# Patient Record
Sex: MALE | Race: WHITE | NOT HISPANIC OR LATINO | URBAN - METROPOLITAN AREA
[De-identification: names, ages, dates, MRNs, and addresses within clinical notes are randomized per-mention and may not be internally consistent; named-entity substitution may affect disease eponyms.]

---

## 2017-01-10 ENCOUNTER — APPOINTMENT (RX ONLY)
Dept: URBAN - METROPOLITAN AREA CLINIC 125 | Facility: CLINIC | Age: 82
Setting detail: DERMATOLOGY
End: 2017-01-10

## 2017-01-10 DIAGNOSIS — L30.9 DERMATITIS, UNSPECIFIED: ICD-10-CM

## 2017-01-10 DIAGNOSIS — Z85.828 PERSONAL HISTORY OF OTHER MALIGNANT NEOPLASM OF SKIN: ICD-10-CM

## 2017-01-10 DIAGNOSIS — D22 MELANOCYTIC NEVI: ICD-10-CM

## 2017-01-10 DIAGNOSIS — L81.4 OTHER MELANIN HYPERPIGMENTATION: ICD-10-CM

## 2017-01-10 DIAGNOSIS — L82.1 OTHER SEBORRHEIC KERATOSIS: ICD-10-CM

## 2017-01-10 PROBLEM — D22.5 MELANOCYTIC NEVI OF TRUNK: Status: ACTIVE | Noted: 2017-01-10

## 2017-01-10 PROCEDURE — 11101: CPT

## 2017-01-10 PROCEDURE — ? BIOPSY BY PUNCH METHOD

## 2017-01-10 PROCEDURE — 99214 OFFICE O/P EST MOD 30 MIN: CPT | Mod: 25

## 2017-01-10 PROCEDURE — ? TREATMENT REGIMEN

## 2017-01-10 PROCEDURE — ? PATIENT SPECIFIC COUNSELING

## 2017-01-10 PROCEDURE — 11100: CPT

## 2017-01-10 PROCEDURE — ? COUNSELING

## 2017-01-10 ASSESSMENT — LOCATION SIMPLE DESCRIPTION DERM
LOCATION SIMPLE: RIGHT PRETIBIAL REGION
LOCATION SIMPLE: RIGHT THIGH
LOCATION SIMPLE: LEFT PRETIBIAL REGION
LOCATION SIMPLE: LEFT FOREARM
LOCATION SIMPLE: RIGHT FOREARM
LOCATION SIMPLE: ABDOMEN
LOCATION SIMPLE: RIGHT UPPER BACK
LOCATION SIMPLE: RIGHT UPPER ARM

## 2017-01-10 ASSESSMENT — LOCATION DETAILED DESCRIPTION DERM
LOCATION DETAILED: LEFT DISTAL PRETIBIAL REGION
LOCATION DETAILED: RIGHT ANTERIOR DISTAL UPPER ARM
LOCATION DETAILED: RIGHT LATERAL DISTAL PRETIBIAL REGION
LOCATION DETAILED: RIGHT DISTAL DORSAL FOREARM
LOCATION DETAILED: RIGHT SUPERIOR UPPER BACK
LOCATION DETAILED: RIGHT ANTERIOR PROXIMAL THIGH
LOCATION DETAILED: RIGHT MEDIAL UPPER BACK
LOCATION DETAILED: EPIGASTRIC SKIN
LOCATION DETAILED: LEFT PROXIMAL DORSAL FOREARM

## 2017-01-10 ASSESSMENT — LOCATION ZONE DERM
LOCATION ZONE: TRUNK
LOCATION ZONE: ARM
LOCATION ZONE: LEG

## 2017-01-10 NOTE — PROCEDURE: BIOPSY BY PUNCH METHOD
Notification Instructions: Patient will be notified of biopsy results. However, patient instructed to call the office if not contacted within 2 weeks.
Size Of Lesion In Cm (Optional): 0
Punch Size In Mm: 3
Bill For Surgical Tray: no
Epidermal Sutures: 5-0 Ethilon
Anesthesia Type: 1% lidocaine without epinephrine
Lab Facility: [facility]
Biopsy Type: H and E
Dressing: pressure dressing
Detail Level: Simple
Wound Care: Bacitracin
Post-Care Instructions: I reviewed with the patient in detail post-care instructions. Patient is to keep the biopsy site dry overnight, and then apply bacitracin twice daily until healed. Patient may apply hydrogen peroxide soaks to remove any crusting.
Suture Removal: 7 days
Body Location Override (Optional - Billing Will Still Be Based On Selected Body Map Location If Applicable): R thigh
Anesthesia Volume In Cc (Will Not Render If 0): 1.5
Consent: Written consent was obtained and risks were reviewed including but not limited to scarring, infection, bleeding, scabbing, incomplete removal, nerve damage and allergy to anesthesia.
Lab: [facility] [place]
Hemostasis: None
Billing Type: [employer]
Home Suture Removal Text: Patient was provided a home suture removal kit and will remove their sutures at home. If they have any questions or difficulties they will call the office.
Lab: [id number]
Billing Type: Third-Party Bill
Home Suture Removal Text: Patient was provided a home suture removal kit and will remove their sutures at home.  If they have any questions or difficulties they will call the office.
Lab Facility: [id number]
Body Location Override (Optional - Billing Will Still Be Based On Selected Body Map Location If Applicable): R upper arm

## 2017-01-10 NOTE — PROCEDURE: PATIENT SPECIFIC COUNSELING
Detail Level: Zone
ERUPTION ON THIGHS MORE THAN ANTERIOR LEGS AND ARMS, AND PREVIOUSLY ON FACE, WITH CHARACTERISTICS OF PSORIASIFORM VS. LICHENOID VS. ECZEMATOUS REACTION. NO CLEAR RELATION TO ACTIVITIES, TOPICAL OR EXPOSURES. REVIEWED MEDS. CONSIDER RXN TO LISINOPRIL VS. ATORVASTATIN.   PBX X 2.

## 2017-01-10 NOTE — PROCEDURE: TREATMENT REGIMEN
Detail Level: Zone
Initiate Treatment: Sun protection light clothing and SPF 30 or higher fluocinonide cream twice daily to rash

## 2017-01-10 NOTE — HPI: FULL BODY SKIN EXAMINATION
What Is The Reason For Today's Visit?: Full Body Skin Examination
What Is The Reason For Today's Visit? (Being Monitored For X): concerning skin lesions on an annual basis
Additional History: Bcc nose many years ago

## 2017-04-19 ENCOUNTER — APPOINTMENT (RX ONLY)
Dept: URBAN - METROPOLITAN AREA CLINIC 125 | Facility: CLINIC | Age: 82
Setting detail: DERMATOLOGY
End: 2017-04-19

## 2017-04-19 DIAGNOSIS — Z85.828 PERSONAL HISTORY OF OTHER MALIGNANT NEOPLASM OF SKIN: ICD-10-CM

## 2017-04-19 DIAGNOSIS — L57.0 ACTINIC KERATOSIS: ICD-10-CM

## 2017-04-19 DIAGNOSIS — L71.8 OTHER ROSACEA: ICD-10-CM

## 2017-04-19 DIAGNOSIS — L40.0 PSORIASIS VULGARIS: ICD-10-CM | Status: IMPROVED

## 2017-04-19 PROCEDURE — ? TREATMENT REGIMEN

## 2017-04-19 PROCEDURE — 17000 DESTRUCT PREMALG LESION: CPT

## 2017-04-19 PROCEDURE — 99213 OFFICE O/P EST LOW 20 MIN: CPT | Mod: 25

## 2017-04-19 PROCEDURE — ? LIQUID NITROGEN

## 2017-04-19 PROCEDURE — ? COUNSELING

## 2017-04-19 ASSESSMENT — LOCATION ZONE DERM
LOCATION ZONE: LEG
LOCATION ZONE: LIP
LOCATION ZONE: FACE

## 2017-04-19 ASSESSMENT — LOCATION DETAILED DESCRIPTION DERM
LOCATION DETAILED: RIGHT INFERIOR VERMILION LIP
LOCATION DETAILED: LEFT CENTRAL MALAR CHEEK
LOCATION DETAILED: RIGHT KNEE
LOCATION DETAILED: LEFT KNEE
LOCATION DETAILED: RIGHT LATERAL MALAR CHEEK

## 2017-04-19 ASSESSMENT — LOCATION SIMPLE DESCRIPTION DERM
LOCATION SIMPLE: LEFT KNEE
LOCATION SIMPLE: RIGHT CHEEK
LOCATION SIMPLE: LEFT CHEEK
LOCATION SIMPLE: RIGHT KNEE
LOCATION SIMPLE: RIGHT LIP

## 2017-04-19 NOTE — PROCEDURE: LIQUID NITROGEN
Duration Of Freeze Thaw-Cycle (Seconds): 1
Render Post-Care Instructions In Note?: no
Number Of Freeze-Thaw Cycles: 2 freeze-thaw cycles
Consent: The patient's consent was obtained including but not limited to risks of crusting, scabbing, blistering, scarring, darker or lighter pigmentary change, recurrence, incomplete removal and infection.
Post-Care Instructions: I reviewed with the patient in detail post-care instructions. Patient is to wear sunprotection, and avoid picking at any of the treated lesions. Pt may apply Vaseline to crusted or scabbing areas.
Detail Level: Simple

## 2019-09-30 ENCOUNTER — PREPPED CHART (OUTPATIENT)
Dept: URBAN - METROPOLITAN AREA CLINIC 32 | Facility: CLINIC | Age: 84
End: 2019-09-30

## 2019-10-28 ASSESSMENT — KERATOMETRY
OD_AXISANGLE2_DEGREES: 115
OD_K2POWER_DIOPTERS: 43.5
OS_AXISANGLE_DEGREES: 155
OS_K2POWER_DIOPTERS: 43.75
OS_K1POWER_DIOPTERS: 45.25
OS_AXISANGLE2_DEGREES: 65
OD_K1POWER_DIOPTERS: 45.5
OD_AXISANGLE_DEGREES: 25

## 2019-10-28 ASSESSMENT — VISUAL ACUITY
OD_PH: 20/50+1
OD_CC: 20/60+1
OS_CC: 20/40+2

## 2019-10-28 ASSESSMENT — TONOMETRY
OD_IOP_MMHG: 16
OS_IOP_MMHG: 17

## 2019-11-07 ENCOUNTER — IOP CHECK (OUTPATIENT)
Dept: URBAN - METROPOLITAN AREA CLINIC 32 | Facility: CLINIC | Age: 84
End: 2019-11-07

## 2019-11-07 DIAGNOSIS — H40.1132: ICD-10-CM

## 2019-11-07 PROCEDURE — 3284F IOP RED >=15% PRE-NTRV LVL: CPT

## 2019-11-07 PROCEDURE — 1036F TOBACCO NON-USER: CPT

## 2019-11-07 PROCEDURE — 99212 OFFICE O/P EST SF 10 MIN: CPT

## 2019-11-07 PROCEDURE — 3285F IOP DOWN <15% OF PRE-SVC LVL: CPT

## 2019-11-07 PROCEDURE — 0517F GLAUCOMA PLAN OF CARE DOCD: CPT

## 2019-11-07 PROCEDURE — G8428 CUR MEDS NOT DOCUMENT: HCPCS

## 2019-11-07 PROCEDURE — G9744 PT NOT ELI D/T ACT DIG HTN: HCPCS

## 2019-11-07 PROCEDURE — 2027F OPTIC NERVE HEAD EVAL DONE: CPT

## 2019-11-07 PROCEDURE — G8756 NO BP MEASURE DOC: HCPCS

## 2019-11-07 PROCEDURE — G9903 PT SCRN TBCO ID AS NON USER: HCPCS

## 2019-11-07 ASSESSMENT — KERATOMETRY
OD_AXISANGLE2_DEGREES: 115
OS_K2POWER_DIOPTERS: 43.75
OD_K2POWER_DIOPTERS: 43.5
OS_AXISANGLE2_DEGREES: 65
OS_AXISANGLE_DEGREES: 155
OS_K1POWER_DIOPTERS: 45.25
OD_AXISANGLE_DEGREES: 25
OD_K1POWER_DIOPTERS: 45.5

## 2019-11-07 ASSESSMENT — VISUAL ACUITY
OD_CC: 20/25
OS_CC: 20/25

## 2019-11-07 ASSESSMENT — TONOMETRY
OS_IOP_MMHG: 18
OD_IOP_MMHG: 17

## 2020-01-06 ENCOUNTER — EST. PATIENT EMERGENCY (OUTPATIENT)
Dept: URBAN - METROPOLITAN AREA CLINIC 32 | Facility: CLINIC | Age: 85
End: 2020-01-06

## 2020-01-06 DIAGNOSIS — H04.123: ICD-10-CM

## 2020-01-06 DIAGNOSIS — Z96.1: ICD-10-CM

## 2020-01-06 DIAGNOSIS — Z98.890: ICD-10-CM

## 2020-01-06 PROCEDURE — 99212 OFFICE O/P EST SF 10 MIN: CPT

## 2020-01-06 PROCEDURE — 92015 DETERMINE REFRACTIVE STATE: CPT

## 2020-01-06 ASSESSMENT — VISUAL ACUITY
OS_CC: 20/40-2
OD_CC: J3
OD_CC: 20/60-2
OS_CC: J2
OD_PH: 20/50-2

## 2020-01-06 ASSESSMENT — KERATOMETRY
OD_AXISANGLE2_DEGREES: 107
OS_AXISANGLE2_DEGREES: 68
OS_K1POWER_DIOPTERS: 45.25
OS_AXISANGLE_DEGREES: 158
OD_K1POWER_DIOPTERS: 45.5
OD_AXISANGLE_DEGREES: 25
OS_K2POWER_DIOPTERS: 43.25
OD_AXISANGLE_DEGREES: 17
OD_AXISANGLE2_DEGREES: 115
OD_K2POWER_DIOPTERS: 42.5

## 2020-01-06 ASSESSMENT — TONOMETRY
OS_IOP_MMHG: 18
OD_IOP_MMHG: 17

## 2020-06-01 ENCOUNTER — VISUAL FIELD FOLLOW-UP (OUTPATIENT)
Dept: URBAN - METROPOLITAN AREA CLINIC 32 | Facility: CLINIC | Age: 85
End: 2020-06-01

## 2020-06-01 DIAGNOSIS — H40.1132: ICD-10-CM

## 2020-06-01 PROCEDURE — 92083 EXTENDED VISUAL FIELD XM: CPT

## 2020-06-01 PROCEDURE — 99213 OFFICE O/P EST LOW 20 MIN: CPT

## 2020-06-01 ASSESSMENT — KERATOMETRY
OS_K2POWER_DIOPTERS: 43.25
OD_K2POWER_DIOPTERS: 42.5
OD_AXISANGLE_DEGREES: 17
OD_AXISANGLE_DEGREES: 25
OD_AXISANGLE2_DEGREES: 115
OD_K1POWER_DIOPTERS: 45.5
OS_AXISANGLE_DEGREES: 158
OS_K1POWER_DIOPTERS: 45.25
OS_AXISANGLE2_DEGREES: 68
OD_AXISANGLE2_DEGREES: 107

## 2020-06-01 ASSESSMENT — TONOMETRY
OS_IOP_MMHG: 21
OS_IOP_MMHG: 23
OD_IOP_MMHG: 19
OD_IOP_MMHG: 21

## 2020-06-01 ASSESSMENT — VISUAL ACUITY
OD_CC: 20/40-1
OS_CC: 20/25-1

## 2020-12-09 ENCOUNTER — ESTABLISHED COMPREHENSIVE EXAM (OUTPATIENT)
Dept: URBAN - METROPOLITAN AREA CLINIC 32 | Facility: CLINIC | Age: 85
End: 2020-12-09

## 2020-12-09 DIAGNOSIS — Z96.1: ICD-10-CM

## 2020-12-09 DIAGNOSIS — Z98.890: ICD-10-CM

## 2020-12-09 DIAGNOSIS — H40.1132: ICD-10-CM

## 2020-12-09 DIAGNOSIS — H35.373: ICD-10-CM

## 2020-12-09 PROCEDURE — 92133 CPTRZD OPH DX IMG PST SGM ON: CPT

## 2020-12-09 PROCEDURE — 92015 DETERMINE REFRACTIVE STATE: CPT

## 2020-12-09 PROCEDURE — 92014 COMPRE OPH EXAM EST PT 1/>: CPT

## 2020-12-09 ASSESSMENT — VISUAL ACUITY
OS_CC: 20/50-2
OD_CC: 20/60-2
OU_CC: J1
OU_CC: 20/40-2

## 2020-12-09 ASSESSMENT — TONOMETRY
OS_IOP_MMHG: 32
OD_IOP_MMHG: 13
OS_IOP_MMHG: 28

## 2020-12-16 ENCOUNTER — IOP CHECK (OUTPATIENT)
Dept: URBAN - METROPOLITAN AREA CLINIC 32 | Facility: CLINIC | Age: 85
End: 2020-12-16

## 2020-12-16 DIAGNOSIS — H40.1132: ICD-10-CM

## 2020-12-16 PROCEDURE — 99213 OFFICE O/P EST LOW 20 MIN: CPT

## 2020-12-16 RX ORDER — NETARSUDIL 0.2 MG/ML
1 SOLUTION/ DROPS OPHTHALMIC; TOPICAL EVERY EVENING
Start: 2020-12-16

## 2020-12-16 ASSESSMENT — TONOMETRY
OS_IOP_MMHG: 28
OD_IOP_MMHG: 28
OD_IOP_MMHG: 25
OS_IOP_MMHG: 25

## 2020-12-16 ASSESSMENT — VISUAL ACUITY
OD_CC: 20/50+2
OS_CC: J2
OD_CC: J2
OS_CC: 20/50

## 2021-01-05 ENCOUNTER — IOP CHECK (OUTPATIENT)
Dept: URBAN - METROPOLITAN AREA CLINIC 32 | Facility: CLINIC | Age: 86
End: 2021-01-05

## 2021-01-05 DIAGNOSIS — H40.1132: ICD-10-CM

## 2021-01-05 PROCEDURE — 99213 OFFICE O/P EST LOW 20 MIN: CPT

## 2021-01-05 ASSESSMENT — TONOMETRY
OD_IOP_MMHG: 13
OS_IOP_MMHG: 13

## 2021-01-05 ASSESSMENT — VISUAL ACUITY
OD_SC: 20/50
OS_SC: 20/40-2

## 2021-04-22 ENCOUNTER — IOP CHECK (OUTPATIENT)
Dept: URBAN - METROPOLITAN AREA CLINIC 32 | Facility: CLINIC | Age: 86
End: 2021-04-22

## 2021-04-22 DIAGNOSIS — H40.1132: ICD-10-CM

## 2021-04-22 PROCEDURE — 99213 OFFICE O/P EST LOW 20 MIN: CPT

## 2021-04-22 PROCEDURE — 92083 EXTENDED VISUAL FIELD XM: CPT

## 2021-04-22 ASSESSMENT — VISUAL ACUITY
OU_CC: 20/40+2
OD_CC: 20/50-2
OD_PH: 20/50
OS_CC: 20/40-1

## 2021-04-22 ASSESSMENT — TONOMETRY
OD_IOP_MMHG: 14
OS_IOP_MMHG: 28
OS_IOP_MMHG: 34

## 2021-06-03 ENCOUNTER — IOP CHECK (OUTPATIENT)
Dept: URBAN - METROPOLITAN AREA CLINIC 32 | Facility: CLINIC | Age: 86
End: 2021-06-03

## 2021-06-03 DIAGNOSIS — H40.1132: ICD-10-CM

## 2021-06-03 PROCEDURE — 99212 OFFICE O/P EST SF 10 MIN: CPT

## 2021-06-03 ASSESSMENT — VISUAL ACUITY
OD_CC: 20/50
OS_CC: 20/40

## 2021-06-03 ASSESSMENT — TONOMETRY
OD_IOP_MMHG: 11
OS_IOP_MMHG: 10
OS_IOP_MMHG: 8
OD_IOP_MMHG: 10

## 2022-01-13 ENCOUNTER — IOP CHECK (OUTPATIENT)
Dept: URBAN - METROPOLITAN AREA CLINIC 32 | Facility: CLINIC | Age: 87
End: 2022-01-13

## 2022-01-13 DIAGNOSIS — H40.1132: ICD-10-CM

## 2022-01-13 PROCEDURE — 99213 OFFICE O/P EST LOW 20 MIN: CPT

## 2022-01-13 RX ORDER — NETARSUDIL 0.2 MG/ML
1 SOLUTION/ DROPS OPHTHALMIC; TOPICAL EVERY EVENING
Start: 2022-01-13

## 2022-01-13 ASSESSMENT — VISUAL ACUITY
OD_PH: 20/40
OD_CC: 20/50-1
OS_CC: 20/30-1

## 2022-01-13 ASSESSMENT — TONOMETRY
OS_IOP_MMHG: 24
OD_IOP_MMHG: 12
OS_IOP_MMHG: 36

## 2022-01-31 ENCOUNTER — COMPREHENSIVE EXAM (OUTPATIENT)
Dept: URBAN - METROPOLITAN AREA CLINIC 32 | Facility: CLINIC | Age: 87
End: 2022-01-31

## 2022-01-31 DIAGNOSIS — H40.1132: ICD-10-CM

## 2022-01-31 PROCEDURE — 92133 CPTRZD OPH DX IMG PST SGM ON: CPT

## 2022-01-31 PROCEDURE — 92014 COMPRE OPH EXAM EST PT 1/>: CPT

## 2022-01-31 ASSESSMENT — VISUAL ACUITY
OS_CC: J1
OS_CC: 20/50
OD_CC: 20/50
OS_PH: 20/40-1
OD_PH: 20/40-2
OD_SC: J7
OD_SC: 20/200
OS_SC: J7
OD_CC: J1
OS_SC: 20/80-1

## 2022-01-31 ASSESSMENT — KERATOMETRY
OD_K2POWER_DIOPTERS: 43.25
OD_K1POWER_DIOPTERS: 45.75
OS_AXISANGLE2_DEGREES: 75
OS_AXISANGLE_DEGREES: 165
OS_K1POWER_DIOPTERS: 45.75
OD_AXISANGLE_DEGREES: 15
OD_AXISANGLE2_DEGREES: 105
OS_K2POWER_DIOPTERS: 43.75

## 2022-01-31 ASSESSMENT — TONOMETRY
OS_IOP_MMHG: 18
OD_IOP_MMHG: 17

## 2022-05-23 ENCOUNTER — FOLLOW UP (OUTPATIENT)
Dept: URBAN - METROPOLITAN AREA CLINIC 32 | Facility: CLINIC | Age: 87
End: 2022-05-23

## 2022-05-23 DIAGNOSIS — H40.1132: ICD-10-CM

## 2022-05-23 PROCEDURE — 92083 EXTENDED VISUAL FIELD XM: CPT

## 2022-05-23 PROCEDURE — 92012 INTRM OPH EXAM EST PATIENT: CPT

## 2022-05-23 ASSESSMENT — TONOMETRY
OS_IOP_MMHG: 36
OD_IOP_MMHG: 34

## 2022-05-23 ASSESSMENT — VISUAL ACUITY
OD_SC: 20/50
OS_SC: 20/50

## 2022-05-23 ASSESSMENT — KERATOMETRY
OS_AXISANGLE2_DEGREES: 75
OS_K2POWER_DIOPTERS: 43.75
OD_AXISANGLE_DEGREES: 15
OD_K1POWER_DIOPTERS: 45.75
OS_K1POWER_DIOPTERS: 45.75
OD_K2POWER_DIOPTERS: 43.25
OS_AXISANGLE_DEGREES: 165
OD_AXISANGLE2_DEGREES: 105

## 2022-06-03 ASSESSMENT — KERATOMETRY
OS_K2POWER_DIOPTERS: 43.75
OS_AXISANGLE_DEGREES: 165
OD_K2POWER_DIOPTERS: 43.25
OS_K1POWER_DIOPTERS: 45.75
OD_AXISANGLE_DEGREES: 15
OD_AXISANGLE2_DEGREES: 105
OS_AXISANGLE2_DEGREES: 75
OD_K1POWER_DIOPTERS: 45.75

## 2022-06-06 ENCOUNTER — PREPPED CHART (OUTPATIENT)
Dept: URBAN - METROPOLITAN AREA CLINIC 32 | Facility: CLINIC | Age: 87
End: 2022-06-06

## 2022-07-20 ENCOUNTER — FOLLOW UP (OUTPATIENT)
Dept: URBAN - METROPOLITAN AREA CLINIC 32 | Facility: CLINIC | Age: 87
End: 2022-07-20

## 2022-07-20 DIAGNOSIS — H40.1132: ICD-10-CM

## 2022-07-20 PROCEDURE — 92012 INTRM OPH EXAM EST PATIENT: CPT

## 2022-07-20 ASSESSMENT — KERATOMETRY
OD_AXISANGLE_DEGREES: 15
OS_K2POWER_DIOPTERS: 43.75
OS_K1POWER_DIOPTERS: 45.75
OS_AXISANGLE2_DEGREES: 75
OD_K1POWER_DIOPTERS: 45.75
OS_AXISANGLE_DEGREES: 165
OD_K2POWER_DIOPTERS: 43.25
OD_AXISANGLE2_DEGREES: 105

## 2022-07-20 ASSESSMENT — VISUAL ACUITY
OS_CC: 20/50-2
OD_CC: 20/50

## 2022-07-20 ASSESSMENT — TONOMETRY
OD_IOP_MMHG: 30
OS_IOP_MMHG: 32

## 2022-11-23 ENCOUNTER — FOLLOW UP (OUTPATIENT)
Dept: URBAN - METROPOLITAN AREA CLINIC 32 | Facility: CLINIC | Age: 87
End: 2022-11-23

## 2022-11-23 DIAGNOSIS — H40.1132: ICD-10-CM

## 2022-11-23 DIAGNOSIS — H16.223: ICD-10-CM

## 2022-11-23 PROCEDURE — 92012 INTRM OPH EXAM EST PATIENT: CPT

## 2022-11-23 ASSESSMENT — VISUAL ACUITY
OD_CC: J5
OD_CC: 20/30
OS_CC: J5

## 2022-11-23 ASSESSMENT — KERATOMETRY
OD_K2POWER_DIOPTERS: 43.25
OD_AXISANGLE_DEGREES: 15
OD_AXISANGLE2_DEGREES: 105
OS_AXISANGLE2_DEGREES: 75
OS_K1POWER_DIOPTERS: 45.75
OS_AXISANGLE_DEGREES: 165
OD_K1POWER_DIOPTERS: 45.75
OS_K2POWER_DIOPTERS: 43.75

## 2022-11-23 ASSESSMENT — TONOMETRY
OS_IOP_MMHG: 29
OD_IOP_MMHG: 28

## 2023-04-11 ENCOUNTER — COMPREHENSIVE EXAM (OUTPATIENT)
Dept: URBAN - METROPOLITAN AREA CLINIC 32 | Facility: CLINIC | Age: 88
End: 2023-04-11

## 2023-04-11 DIAGNOSIS — H35.373: ICD-10-CM

## 2023-04-11 DIAGNOSIS — Z96.1: ICD-10-CM

## 2023-04-11 DIAGNOSIS — H35.033: ICD-10-CM

## 2023-04-11 DIAGNOSIS — H16.223: ICD-10-CM

## 2023-04-11 DIAGNOSIS — H40.1132: ICD-10-CM

## 2023-04-11 DIAGNOSIS — Z98.890: ICD-10-CM

## 2023-04-11 PROCEDURE — 92015 DETERMINE REFRACTIVE STATE: CPT

## 2023-04-11 PROCEDURE — 92133 CPTRZD OPH DX IMG PST SGM ON: CPT

## 2023-04-11 PROCEDURE — 92134 CPTRZ OPH DX IMG PST SGM RTA: CPT

## 2023-04-11 PROCEDURE — 99214 OFFICE O/P EST MOD 30 MIN: CPT

## 2023-04-11 RX ORDER — DORZOLAMIDE HYDROCHLORIDE TIMOLOL MALEATE 20; 5 MG/ML; MG/ML
1 SOLUTION/ DROPS OPHTHALMIC TWICE A DAY
Start: 2023-04-11

## 2023-04-11 ASSESSMENT — KERATOMETRY
OS_K2POWER_DIOPTERS: 43.75
OD_AXISANGLE_DEGREES: 15
OS_AXISANGLE2_DEGREES: 75
OD_AXISANGLE2_DEGREES: 105
OS_K1POWER_DIOPTERS: 45.75
OD_K1POWER_DIOPTERS: 45.75
OS_AXISANGLE_DEGREES: 165
OD_K2POWER_DIOPTERS: 43.25

## 2023-04-11 ASSESSMENT — VISUAL ACUITY
OD_CC: J5
OD_CC: 20/200
OD_SC: 20/200
OD_SC: J16

## 2023-04-11 ASSESSMENT — TONOMETRY
OD_IOP_MMHG: 28
OS_IOP_MMHG: 47

## 2023-05-03 ENCOUNTER — FOLLOW UP (OUTPATIENT)
Dept: URBAN - METROPOLITAN AREA CLINIC 32 | Facility: CLINIC | Age: 88
End: 2023-05-03

## 2023-05-03 DIAGNOSIS — H40.1132: ICD-10-CM

## 2023-05-03 PROCEDURE — 92012 INTRM OPH EXAM EST PATIENT: CPT

## 2023-05-03 ASSESSMENT — VISUAL ACUITY
OD_CC: J7
OD_CC: 20/200

## 2023-05-03 ASSESSMENT — TONOMETRY
OS_IOP_MMHG: 20
OD_IOP_MMHG: 19
OD_IOP_MMHG: 20
OS_IOP_MMHG: 26

## 2023-05-03 ASSESSMENT — KERATOMETRY
OD_K2POWER_DIOPTERS: 43.25
OS_AXISANGLE2_DEGREES: 75
OD_AXISANGLE2_DEGREES: 105
OS_K1POWER_DIOPTERS: 45.75
OS_K2POWER_DIOPTERS: 43.75
OD_AXISANGLE_DEGREES: 15
OD_K1POWER_DIOPTERS: 45.75
OS_AXISANGLE_DEGREES: 165

## 2023-08-10 ENCOUNTER — FOLLOW UP (OUTPATIENT)
Dept: URBAN - METROPOLITAN AREA CLINIC 32 | Facility: CLINIC | Age: 88
End: 2023-08-10

## 2023-08-10 DIAGNOSIS — H40.1132: ICD-10-CM

## 2023-08-10 PROCEDURE — 99213 OFFICE O/P EST LOW 20 MIN: CPT

## 2023-08-10 PROCEDURE — 92133 CPTRZD OPH DX IMG PST SGM ON: CPT

## 2023-08-10 ASSESSMENT — KERATOMETRY
OD_AXISANGLE_DEGREES: 15
OD_K1POWER_DIOPTERS: 45.75
OD_AXISANGLE2_DEGREES: 105
OS_AXISANGLE_DEGREES: 165
OS_K2POWER_DIOPTERS: 43.75
OS_AXISANGLE2_DEGREES: 75
OS_K1POWER_DIOPTERS: 45.75
OD_K2POWER_DIOPTERS: 43.25

## 2023-08-10 ASSESSMENT — VISUAL ACUITY
OD_CC: J10
OD_SC: 20/200

## 2023-08-10 ASSESSMENT — TONOMETRY
OD_IOP_MMHG: 12
OS_IOP_MMHG: 18